# Patient Record
Sex: FEMALE | Race: WHITE
[De-identification: names, ages, dates, MRNs, and addresses within clinical notes are randomized per-mention and may not be internally consistent; named-entity substitution may affect disease eponyms.]

---

## 2018-05-17 ENCOUNTER — HOSPITAL ENCOUNTER (EMERGENCY)
Dept: HOSPITAL 74 - FER | Age: 48
Discharge: HOME | End: 2018-05-17
Payer: COMMERCIAL

## 2018-05-17 VITALS — SYSTOLIC BLOOD PRESSURE: 137 MMHG | TEMPERATURE: 98.6 F | DIASTOLIC BLOOD PRESSURE: 90 MMHG | HEART RATE: 83 BPM

## 2018-05-17 VITALS — BODY MASS INDEX: 28.3 KG/M2

## 2018-05-17 DIAGNOSIS — I10: ICD-10-CM

## 2018-05-17 DIAGNOSIS — R07.89: Primary | ICD-10-CM

## 2018-05-17 DIAGNOSIS — E78.00: ICD-10-CM

## 2018-05-17 LAB
ALBUMIN SERPL-MCNC: 4 G/DL (ref 3.5–5)
ALP SERPL-CCNC: 101 U/L (ref 32–92)
ALT SERPL-CCNC: 45 U/L (ref 10–40)
ANION GAP SERPL CALC-SCNC: 10 MMOL/L (ref 8–16)
AST SERPL-CCNC: 33 U/L (ref 10–42)
BASOPHILS # BLD: 2.8 % (ref 0–2)
BILIRUB SERPL-MCNC: 0.8 MG/DL (ref 0.2–1)
BUN SERPL-MCNC: 13 MG/DL (ref 7–18)
CALCIUM SERPL-MCNC: 9.5 MG/DL (ref 8.4–10.2)
CHLORIDE SERPL-SCNC: 100 MMOL/L (ref 98–107)
CO2 SERPL-SCNC: 27 MMOL/L (ref 22–28)
CREAT SERPL-MCNC: 0.8 MG/DL (ref 0.6–1.3)
DEPRECATED RDW RBC AUTO: 14.1 % (ref 11.6–15.6)
EOSINOPHIL # BLD: 2.9 % (ref 0–4.5)
GLUCOSE SERPL-MCNC: 87 MG/DL (ref 74–106)
HCT VFR BLD CALC: 37.6 % (ref 32.4–45.2)
HGB BLD-MCNC: 12.3 GM/DL (ref 10.7–15.3)
LYMPHOCYTES # BLD: 27 % (ref 8–40)
MCH RBC QN AUTO: 27.5 PG (ref 25.7–33.7)
MCHC RBC AUTO-ENTMCNC: 32.7 G/DL (ref 32–36)
MCV RBC: 84.1 FL (ref 80–96)
MONOCYTES # BLD AUTO: 9.1 % (ref 3.8–10.2)
NEUTROPHILS # BLD: 58.2 % (ref 42.8–82.8)
PLATELET # BLD AUTO: 433 K/MM3 (ref 134–434)
PMV BLD: 7.8 FL (ref 7.5–11.1)
POTASSIUM SERPLBLD-SCNC: 3.7 MMOL/L (ref 3.5–5.1)
PROT SERPL-MCNC: 8.1 G/DL (ref 6.4–8.3)
RBC # BLD AUTO: 4.47 M/MM3 (ref 3.6–5.2)
SODIUM SERPL-SCNC: 137 MMOL/L (ref 136–145)
WBC # BLD AUTO: 8.2 K/MM3 (ref 4–10.8)

## 2018-05-17 PROCEDURE — 3E033GC INTRODUCTION OF OTHER THERAPEUTIC SUBSTANCE INTO PERIPHERAL VEIN, PERCUTANEOUS APPROACH: ICD-10-PCS

## 2018-05-17 NOTE — PDOC
History of Present Illness





- General


History Source: Patient


Exam Limitations: No Limitations





- History of Present Illness


Initial Comments: 





05/17/18 19:54


 


A portion of this note was documented by scribe services under my direction. I 

have reviewed the details of the note, within reason, and agree with the 

documentation.  The case summary and management plan written by me. 





Medical decision making: This is a 48-year-old female who comes in with 2 days 

of substernal chest pain and some anterior left neck pain. Patient has cardiac 

risk factors of borderline high cholesterol and hypertension. Patient is on 

medication for hypertension but not high cholesterol. Patient is complaining of 

generalized not feeling well as well.





Will obtain a workup including CBC, comp, cardiac enzymes, chest x-ray, EKG





We'll give patient some Pepcid and reassess and review results of workup.





Chest x-ray no acute pathology as reviewed by me





20:30


Reevaluation patient feels better with Pepcid. Patient's blood work is 

unremarkable with the exception of a mild elevation in her AST and alkaline 

phosphatase. Patient has no tenderness in her abdomen. Patient given copies of 

her results and discharged home.








<Dayanna Hooks I - Last Filed: 05/17/18 20:32>





- General


History Source: Patient


Exam Limitations: No Limitations





- History of Present Illness


Initial Comments: 





The patient is a 48 year old female who presents to the emergency department 

complaining of left sided neck discomfort and midsternal chest pain. The 

patient reports intermittent episodes of midsternal chest pain occurring for 2 

days. She describes her chest pain as localized ranked 5/10 in severity, with 

no modifying factors. She states she "has not felt the chest pain for awhile" 

and "felt like passing out" yesterday. The patient describes left sided neck 

discomfort as a constant pressure. The patient reports she had an ear infection 

and sore throat in February, and was treated with IV antibiotics. Of note, she 

visited an urgent care today for irritated eyes and nose and was prescribed 

nose spray. 





The patient denies chest pain, shortness of breath, headache, and dizziness.


Denies fevers, chills, nausea, vomiting, diarrhea, and constipation.


Denies dysuria, frequency, urgency, and hematuria.





PAST MEDICAL HISTORY:  Hypertension, Hayfever. 


PAST SURGICAL HISTORY:  Cholecystectomy.


FAMILY HISTORY:  no pertinent history


SOCIAL HISTORY:  Pt lives with  family and is employed.


MEDICATIONS:  reviewed


ALLERGIES:  As per nursing notes





ROS


General:  No fevers or chills, no weakness, no weight loss 


HEENT: (+) Left sided neck discomfort. No change in vision.  No sore throat,. 

No ear pain


Cardiovascular: (+)Midsternal chest pain. No shortness of breath


Respiratory: No cough, or wheezing. 


Gastrointestinal:  no nausea, vomiting, diarrhea or constipation,  No rectal 

bleeding


Genitourinary:  No dysuria, hematuria, or frequency


Musculoskeletal:  No joint or muscle pain or swelling


Neurologic: No headache, vertigo, dizziness or loss of consciousness


Psychiatric: nor depression 


Skin: No rashes or easy bruising


Endocrine: no increased thirst or abnormal weight change


Allergic: no skin or latex allergy


All other systems reviewed and normal





Adult Exam:


General: Well-nourished well-developed individual, no acute distress


HEENT: Throat: Normal, tonsils normal, no erythema or exudate


Neck: Supple, no meningeal signs, no lymphadenopathy


Eyes:Pupils equal reactive and round, extraocular motion intact


Chest: Nontender to palpation 


Cardiac: S1-S2 normal, regular rate and rhythm, no murmurs rubs or gallops


Respiratory: Lungs clear to auscultation bilateral


Abdomen: Soft, nondistended, normal bowel sounds, nontender to palpation 

diffusely


Extremities: Warm, dry, no cyanosis, clubbing, or edema


Skin: No rashes


Neuro: Alert and oriented x3, nonfocal exam, grossly intact, normal gait


Psych: Normal mood and affect








<Dagoberto Wynne - Last Filed: 05/17/18 20:36>





- General


Chief Complaint: Chest Pain


Stated Complaint: NOT FEELING WELL,neck pain,chest burning


Time Seen by Provider: 05/17/18 19:04





Past History





- Past Medical History


COPD: No


HTN: Yes


Other medical history: Hay fever





- Surgical History


Cholecystectomy: Yes





- Suicide/Smoking/Psychosocial Hx


Smoking History: Never smoked


Hx Alcohol Use: Yes (socially)


Drug/Substance Use Hx: No


Substance Use Type: None





<Dayanna Hooks - Last Filed: 05/17/18 20:32>





<Dagoberto Wynne - Last Filed: 05/17/18 20:36>





- Past Medical History


Allergies/Adverse Reactions: 


 Allergies











Allergy/AdvReac Type Severity Reaction Status Date / Time


 


No Known Allergies Allergy   Verified 05/17/18 18:46











Home Medications: 


Ambulatory Orders





Hydrochlorothiazide [Hctz -] 0 mg PO DAILY 05/17/18 


Metoprolol Succinate [Toprol Xl] 0 mg PO DAILY 05/17/18 











*Physical Exam





- Vital Signs


 Last Vital Signs











Temp Pulse Resp BP Pulse Ox


 


 98.6 F   83   18   137/90   99 


 


 05/17/18 18:49  05/17/18 18:49  05/17/18 18:49  05/17/18 18:49  05/17/18 18:49














<Dayanna Hooks - Last Filed: 05/17/18 20:32>





- Vital Signs


 Last Vital Signs











Temp Pulse Resp BP Pulse Ox


 


 98.6 F   83   18   137/90   99 


 


 05/17/18 18:49  05/17/18 18:49  05/17/18 18:49  05/17/18 18:49  05/17/18 18:49














<Dagoberto Wynne - Last Filed: 05/17/18 20:36>





**Heart Score/ECG Review





- History


History: Slightly suspicious





- Electrocardiogram


EKG: Normal





- Age


Age: 45-65





- Risk Factors


Risk Factors Heart Score: Yes Hx Hypercholesterolemia, Yes Hx Hypertension


Based on the list above the patient has:: 1-2 risk factors





- Troponin


Troponin: </= normal limit





- Score


Heart Score - Total: 2





<Dayanna Hooks - Last Filed: 05/17/18 20:32>





ED Treatment Course





- LABORATORY


CBC & Chemistry Diagram: 


 05/17/18 19:15





 05/17/18 19:15





<Dayanna Hooks - Last Filed: 05/17/18 20:32>





- LABORATORY


CBC & Chemistry Diagram: 


 05/17/18 19:15





 05/17/18 19:15





- ADDITIONAL ORDERS


Additional order review: 


 Laboratory  Results











  05/17/18 05/17/18 05/17/18





  19:15 19:15 19:15


 


Sodium    137


 


Potassium    3.7


 


Chloride    100


 


Carbon Dioxide    27


 


Anion Gap    10


 


BUN    13


 


Creatinine    0.8


 


Creat Clearance w eGFR    > 60


 


Random Glucose    87


 


Calcium    9.5


 


Total Bilirubin    0.8


 


AST    33


 


ALT    45 H


 


Alkaline Phosphatase    101 H


 


Creatine Kinase  151  


 


Creatine Kinase Index  0.6  


 


CK-MB (CK-2)  1.0  


 


Troponin I   < 0.03 


 


Total Protein    8.1


 


Albumin    4.0








 











  05/17/18





  19:15


 


RBC  4.47


 


MCV  84.1


 


MCHC  32.7


 


RDW  14.1


 


MPV  7.8


 


Neutrophils %  58.2


 


Lymphocytes %  27.0


 


Monocytes %  9.1


 


Eosinophils %  2.9


 


Basophils %  2.8 H














<Dagoberto Wynne - Last Filed: 05/17/18 20:36>





*DC/Admit/Observation/Transfer





- Discharge Dispostion


Decision to Admit order: No





<Dayanna Hooks - Last Filed: 05/17/18 20:32>





- Attestations


Scribe Attestion: 





Documentation prepared by Dagoberto Wynne, acting as medical scribe for 

Dayanna Hooks MD.





<Dagoberto Wynne - Last Filed: 05/17/18 20:36>


Diagnosis at time of Disposition: 


 Chest pain, atypical








- Discharge Dispostion


Disposition: HOME


Condition at time of disposition: Stable





- Patient Instructions


Printed Discharge Instructions:  DI for Atypical Chest Pain


Additional Instructions: 


You can take Tylenol as needed for the pain or discomfort.





Addition to that you can also take Pepcid or Zantac,  it is over-the-counter.





A couple of your liver enzyme tests were mildly elevated is important that you 

follow-up with your doctor and have them repeated in a couple weeks to make 

sure they are normal





Return to the emergency department immediately with ANY new, persistent or 

worsening symptoms.





Continue any medications as previously prescribed by your physician.





You should follow up with your primary doctor as soon as possible regarding 

today's emergency department visit.


.


Please make sure your doctor reviews the results of your emergency evaluation.





Thank you for coming to the   Emergency Department today for your care. It was 

a pleasure to see you today. Please note that your evaluation is INCOMPLETE 

until you  follow-up with your doctor.

## 2018-05-18 NOTE — EKG
Test Reason : 

Blood Pressure : ***/*** mmHG

Vent. Rate : 087 BPM     Atrial Rate : 087 BPM

   P-R Int : 146 ms          QRS Dur : 086 ms

    QT Int : 388 ms       P-R-T Axes : 054 026 022 degrees

   QTc Int : 466 ms

 

NORMAL SINUS RHYTHM

POSSIBLE LEFT ATRIAL ENLARGEMENT

NO PREVIOUS ECGS AVAILABLE

Confirmed by KATHARINA POOL, KARMA ([id number]) on 5/18/2018 11:21:40 AM

 

Referred By: DR FREGOSO           Confirmed By:KARMA POSADAS MD